# Patient Record
Sex: FEMALE | Race: WHITE | NOT HISPANIC OR LATINO | URBAN - METROPOLITAN AREA
[De-identification: names, ages, dates, MRNs, and addresses within clinical notes are randomized per-mention and may not be internally consistent; named-entity substitution may affect disease eponyms.]

---

## 2015-09-15 PROBLEM — H04.123: Noted: 2020-06-19

## 2017-03-27 ENCOUNTER — IMPORTED ENCOUNTER (OUTPATIENT)
Dept: URBAN - METROPOLITAN AREA CLINIC 50 | Facility: CLINIC | Age: 72
End: 2017-03-27

## 2018-06-20 ENCOUNTER — IMPORTED ENCOUNTER (OUTPATIENT)
Dept: URBAN - METROPOLITAN AREA CLINIC 50 | Facility: CLINIC | Age: 73
End: 2018-06-20

## 2019-05-06 ENCOUNTER — IMPORTED ENCOUNTER (OUTPATIENT)
Dept: URBAN - METROPOLITAN AREA CLINIC 50 | Facility: CLINIC | Age: 74
End: 2019-05-06

## 2020-06-19 ENCOUNTER — IMPORTED ENCOUNTER (OUTPATIENT)
Dept: URBAN - METROPOLITAN AREA CLINIC 50 | Facility: CLINIC | Age: 75
End: 2020-06-19

## 2021-05-14 ASSESSMENT — VISUAL ACUITY
OS_CC: J1+
OS_CC: 20/30
OS_CC: 20/20
OD_BAT: 20/50
OS_PH: 20/30
OD_OTHER: 20/70. 20/100.
OS_OTHER: 20/50. 20/100.
OD_CC: 20/25-
OS_CC: J1+
OS_CC: 20/40
OS_OTHER: 20/30. 20/40.
OD_OTHER: 20/25. 20/40.
OD_CC: J1+
OS_PH: 20/25
OD_CC: J1+-2
OD_CC: J1+
OS_BAT: 20/40
OD_CC: 20/50-2
OD_BAT: 20/40
OS_CC: 20/30+
OS_BAT: 20/50
OD_OTHER: 20/50. 20/80.
OD_CC: 20/25
OD_BAT: 20/70
OS_OTHER: 20/50. 20/80.
OS_OTHER: 20/40. 20/50.
OD_CC: 20/30+
OD_OTHER: 20/40. 20/40.
OS_BAT: 20/30
OD_BAT: 20/25
OD_PH: 20/25
OS_CC: J1+@ 18 IN
OS_CC: J1+-2
OS_CC: 20/25
OD_CC: 20/40
OD_CC: J1+@ 18 IN
OS_BAT: 20/50

## 2021-05-14 ASSESSMENT — TONOMETRY
OD_IOP_MMHG: 18
OS_IOP_MMHG: 19
OD_IOP_MMHG: 19
OS_IOP_MMHG: 18
OS_IOP_MMHG: 20
OD_IOP_MMHG: 18
OD_IOP_MMHG: 19
OS_IOP_MMHG: 20

## 2021-05-28 ENCOUNTER — PREPPED CHART (OUTPATIENT)
Dept: URBAN - METROPOLITAN AREA CLINIC 49 | Facility: CLINIC | Age: 76
End: 2021-05-28

## 2021-06-02 ENCOUNTER — COMPREHENSIVE EXAM (OUTPATIENT)
Dept: URBAN - METROPOLITAN AREA CLINIC 49 | Facility: CLINIC | Age: 76
End: 2021-06-02

## 2021-06-02 DIAGNOSIS — H43.813: ICD-10-CM

## 2021-06-02 DIAGNOSIS — H35.373: ICD-10-CM

## 2021-06-02 DIAGNOSIS — H04.123: ICD-10-CM

## 2021-06-02 DIAGNOSIS — H25.13: ICD-10-CM

## 2021-06-02 PROCEDURE — 92015 DETERMINE REFRACTIVE STATE: CPT

## 2021-06-02 PROCEDURE — 92134 CPTRZ OPH DX IMG PST SGM RTA: CPT

## 2021-06-02 PROCEDURE — 92014 COMPRE OPH EXAM EST PT 1/>: CPT

## 2021-06-02 ASSESSMENT — VISUAL ACUITY
OD_GLARE: 20/70
OS_CC: 20/30-1
OS_GLARE: 20/100
OD_CC: 20/30-1
OD_GLARE: 20/100
OS_GLARE: 20/80
OD_PH: 20/25
OU_CC: J1
OS_PH: 20/30

## 2021-06-02 NOTE — PATIENT DISCUSSION
continue to monitor,  patient is not using any artificial tears at this time states she just blinks to help it.

## 2021-06-11 ENCOUNTER — BIOMETRY (OUTPATIENT)
Dept: URBAN - METROPOLITAN AREA CLINIC 49 | Facility: CLINIC | Age: 76
End: 2021-06-11

## 2021-06-11 DIAGNOSIS — H25.13: ICD-10-CM

## 2021-06-11 PROCEDURE — 92136 OPHTHALMIC BIOMETRY: CPT

## 2021-06-11 PROCEDURE — TOPOIOL CORNEAL TOPOGRAPHY-PREMIUM IOL

## 2021-06-11 ASSESSMENT — KERATOMETRY
OS_K2POWER_DIOPTERS: 41.25
OD_K2POWER_DIOPTERS: 41.75
OS_AXISANGLE_DEGREES: 017
OD_AXISANGLE_DEGREES: 146
OS_K1POWER_DIOPTERS: 43.00
OD_K1POWER_DIOPTERS: 42.50
OD_AXISANGLE2_DEGREES: 56
OS_AXISANGLE2_DEGREES: 107

## 2021-08-09 ENCOUNTER — PRE OP - CE/IOL OS (OUTPATIENT)
Dept: URBAN - METROPOLITAN AREA CLINIC 49 | Facility: CLINIC | Age: 76
End: 2021-08-09

## 2021-08-09 DIAGNOSIS — H25.12: ICD-10-CM

## 2021-08-09 DIAGNOSIS — H35.373: ICD-10-CM

## 2021-08-09 PROCEDURE — PREOP PRE OP VISIT

## 2021-08-09 PROCEDURE — 92134 CPTRZ OPH DX IMG PST SGM RTA: CPT

## 2021-08-09 ASSESSMENT — TONOMETRY
OD_IOP_MMHG: 20
OS_IOP_MMHG: 21

## 2021-08-09 ASSESSMENT — KERATOMETRY
OD_K2POWER_DIOPTERS: 41.75
OS_K1POWER_DIOPTERS: 43.00
OS_AXISANGLE2_DEGREES: 107
OD_AXISANGLE2_DEGREES: 56
OD_AXISANGLE_DEGREES: 146
OS_K2POWER_DIOPTERS: 41.25
OS_AXISANGLE_DEGREES: 017
OD_K1POWER_DIOPTERS: 42.50

## 2021-08-09 ASSESSMENT — VISUAL ACUITY
OD_CC: 20/30
OS_CC: 20/25-1

## 2021-08-09 NOTE — PATIENT DISCUSSION
CATARACT SURGERY PLANNER - MULTIFOCAL IOL/+FEMTO: Phacoemulsification with IOL: Eye: OS|DOS: 8/17/1|Model: [id number]|Power: +19. 5|Target: PLANO|Femto: YES|Arcs: - @ 15 ; - @ 195|Axis: 15|Visc: DUET|Omidria: YES|10% Phenylephrine: YES|Epi-shugarcaine: YES|Phaco Setting: dense|BSS+: NO|Trypan Blue: NO|CTR: NO|Olive Tip: NO|Atropine: NO|Pupilloplasty: NO|Notes: PLAN: [id number] @ PLANO OS.

## 2021-08-17 ENCOUNTER — SAME DAY PO - CE/IOL (OUTPATIENT)
Dept: URBAN - METROPOLITAN AREA CLINIC 49 | Facility: CLINIC | Age: 76
End: 2021-08-17

## 2021-08-17 ENCOUNTER — SURGERY/PROCEDURE (OUTPATIENT)
Dept: URBAN - METROPOLITAN AREA SURGERY 16 | Facility: SURGERY | Age: 76
End: 2021-08-17

## 2021-08-17 DIAGNOSIS — H25.12: ICD-10-CM

## 2021-08-17 DIAGNOSIS — Z98.42: ICD-10-CM

## 2021-08-17 DIAGNOSIS — Z96.1: ICD-10-CM

## 2021-08-17 PROCEDURE — 66984 XCAPSL CTRC RMVL W/O ECP: CPT

## 2021-08-17 PROCEDURE — DFWXXFEMTO SYNERGY TORIC IOL WITH FEMTO

## 2021-08-17 PROCEDURE — 99024 POSTOP FOLLOW-UP VISIT: CPT

## 2021-08-17 RX ORDER — SODIUM CHLORIDE 50 MG/ML
1 SOLUTION OPHTHALMIC
Start: 2021-08-17

## 2021-08-17 ASSESSMENT — KERATOMETRY
OS_AXISANGLE_DEGREES: 017
OD_AXISANGLE2_DEGREES: 56
OS_K2POWER_DIOPTERS: 41.25
OD_K2POWER_DIOPTERS: 41.75
OD_AXISANGLE_DEGREES: 146
OS_AXISANGLE2_DEGREES: 107
OD_AXISANGLE_DEGREES: 146
OS_K2POWER_DIOPTERS: 41.25
OS_AXISANGLE2_DEGREES: 107
OD_AXISANGLE2_DEGREES: 56
OD_K2POWER_DIOPTERS: 41.75
OD_K1POWER_DIOPTERS: 42.50
OS_K1POWER_DIOPTERS: 43.00
OS_K1POWER_DIOPTERS: 43.00
OD_K1POWER_DIOPTERS: 42.50
OS_AXISANGLE_DEGREES: 017

## 2021-08-17 ASSESSMENT — VISUAL ACUITY: OS_SC: 20/80-1

## 2021-08-17 ASSESSMENT — TONOMETRY: OS_IOP_MMHG: 22

## 2021-08-23 ENCOUNTER — PRE OP - CE/IOL OD / 1 WEEK PO OS (OUTPATIENT)
Dept: URBAN - METROPOLITAN AREA CLINIC 49 | Facility: CLINIC | Age: 76
End: 2021-08-23

## 2021-08-23 DIAGNOSIS — H25.11: ICD-10-CM

## 2021-08-23 DIAGNOSIS — Z96.1: ICD-10-CM

## 2021-08-23 DIAGNOSIS — H25.12: ICD-10-CM

## 2021-08-23 PROCEDURE — PREOP PRE OP VISIT

## 2021-08-23 ASSESSMENT — TONOMETRY
OD_IOP_MMHG: 19
OS_IOP_MMHG: 18

## 2021-08-23 ASSESSMENT — KERATOMETRY
OS_AXISANGLE_DEGREES: 017
OD_K2POWER_DIOPTERS: 41.75
OS_K1POWER_DIOPTERS: 43.00
OD_AXISANGLE_DEGREES: 146
OS_K2POWER_DIOPTERS: 41.25
OD_AXISANGLE2_DEGREES: 56
OS_AXISANGLE2_DEGREES: 107
OD_K1POWER_DIOPTERS: 42.50

## 2021-08-23 ASSESSMENT — VISUAL ACUITY
OD_CC: 20/30
OS_SC: J1-2
OS_CC: 20/40+2
OS_SC: 20/25-1

## 2021-08-23 NOTE — PATIENT DISCUSSION
CATARACT SURGERY PLANNER - MULTIFOCAL IOL/+FEMTO: Phacoemulsification with IOL: Eye: OD|DOS: 08/31/2021|Model: [id number]|Power: +17.00|Target: PLANO|Femto: YES|Arcs: 45 @ 0 ; 45 @ 180|Axis: -|Visc: DUET|Omidria: YES|10% Phenylephrine: YES|Epi-shugarcaine: YES|Phaco Setting: DENSE|BSS+: NO|Trypan Blue: NO|CTR: NO|Olive Tip: NO|Atropine: NO|Pupilloplasty: NO|Notes: DENSE. PLAN ORA.

## 2021-08-23 NOTE — PATIENT DISCUSSION
s/p PO # 1 Cataract extraction with * IOL, doing well. Continue with post op drops as directed, see instruction sheet provided to the patient. The patient would like to schedule cataract extraction with * IOL.

## 2021-08-31 ENCOUNTER — SURGERY/PROCEDURE (OUTPATIENT)
Dept: URBAN - METROPOLITAN AREA SURGERY 16 | Facility: SURGERY | Age: 76
End: 2021-08-31

## 2021-08-31 ENCOUNTER — SAME DAY PO - CE/IOL (OUTPATIENT)
Dept: URBAN - METROPOLITAN AREA CLINIC 49 | Facility: CLINIC | Age: 76
End: 2021-08-31

## 2021-08-31 DIAGNOSIS — H25.11: ICD-10-CM

## 2021-08-31 DIAGNOSIS — Z96.1: ICD-10-CM

## 2021-08-31 DIAGNOSIS — Z98.41: ICD-10-CM

## 2021-08-31 PROCEDURE — DFR00VFEMT SYNERGY IOL WITH FEMTO

## 2021-08-31 PROCEDURE — 66984 XCAPSL CTRC RMVL W/O ECP: CPT

## 2021-08-31 PROCEDURE — 99024 POSTOP FOLLOW-UP VISIT: CPT

## 2021-08-31 ASSESSMENT — KERATOMETRY
OD_AXISANGLE_DEGREES: 146
OS_K1POWER_DIOPTERS: 43.00
OD_K1POWER_DIOPTERS: 42.50
OD_AXISANGLE2_DEGREES: 56
OD_K2POWER_DIOPTERS: 41.75
OS_AXISANGLE2_DEGREES: 107
OS_K2POWER_DIOPTERS: 41.25
OS_AXISANGLE_DEGREES: 017

## 2021-08-31 ASSESSMENT — VISUAL ACUITY
OD_SC: 20/60
OD_SC: 20/80
OD_SC: J3

## 2021-08-31 ASSESSMENT — TONOMETRY: OD_IOP_MMHG: 18

## 2021-09-07 ENCOUNTER — 1 WEEK POST-OP (OUTPATIENT)
Dept: URBAN - METROPOLITAN AREA CLINIC 49 | Facility: CLINIC | Age: 76
End: 2021-09-07

## 2021-09-07 DIAGNOSIS — Z98.41: ICD-10-CM

## 2021-09-07 DIAGNOSIS — H35.373: ICD-10-CM

## 2021-09-07 DIAGNOSIS — Z96.1: ICD-10-CM

## 2021-09-07 PROCEDURE — 99024 POSTOP FOLLOW-UP VISIT: CPT

## 2021-09-07 PROCEDURE — 92134 CPTRZ OPH DX IMG PST SGM RTA: CPT

## 2021-09-07 ASSESSMENT — VISUAL ACUITY
OD_SC: 20/40+2
OU_SC: J1
OS_SC: 20/40+2
OU_SC: 20/25-2
OU_CC: J2

## 2021-09-07 ASSESSMENT — TONOMETRY
OD_IOP_MMHG: 18
OS_IOP_MMHG: 18

## 2021-10-04 ENCOUNTER — 4 WEEK POST-OP (OUTPATIENT)
Dept: URBAN - METROPOLITAN AREA CLINIC 49 | Facility: CLINIC | Age: 76
End: 2021-10-04

## 2021-10-04 DIAGNOSIS — H26.493: ICD-10-CM

## 2021-10-04 DIAGNOSIS — Z96.1: ICD-10-CM

## 2021-10-04 DIAGNOSIS — H43.813: ICD-10-CM

## 2021-10-04 DIAGNOSIS — H35.373: ICD-10-CM

## 2021-10-04 DIAGNOSIS — Z98.42: ICD-10-CM

## 2021-10-04 DIAGNOSIS — H16.223: ICD-10-CM

## 2021-10-04 PROCEDURE — 99024 POSTOP FOLLOW-UP VISIT: CPT

## 2021-10-04 RX ORDER — LIFITEGRAST 50 MG/ML: 1 SOLUTION/ DROPS OPHTHALMIC TWICE A DAY

## 2021-10-04 ASSESSMENT — VISUAL ACUITY
OS_GLARE: 20/50
OU_SC: J2@16"
OD_SC: 20/50+2
OD_PH: 20/40
OU_SC: 20/25-2
OS_GLARE: 20/50
OS_SC: J3@16"
OS_SC: J3@26"
OU_SC: J2@26"
OD_SC: J3@16"
OS_SC: 20/30-2
OD_GLARE: 20/70
OD_SC: J5@26"
OD_GLARE: 20/50

## 2021-10-04 ASSESSMENT — TONOMETRY
OD_IOP_MMHG: 15
OS_IOP_MMHG: 15

## 2021-10-04 NOTE — PATIENT DISCUSSION
PCO ([address]): Visually significant PCO present on exam today. Recommend YAG laser capsulotomy to improve vision and decrease glare symptoms. RBAs of procedure discussed. Patient agrees and wishes to proceed. Pt. complains of blurry VA od >OS post surgery.  Still struggling with distance vision and near vision with Synergy IOL.  Will treat SHABBIR with Xiidra ou bid and then plan Yag cap os and then OD.  Pt. understands we need to take a stepwise approach to treat all causes of blurry Va including SHABBIR and PCO.  Also d/w patient possibility of IOL exchange if pt. doesn't respond to dry eye therapy and doesn't want yag cap.  IOL exchange will be difficult if done post yag cap OU.

## 2021-10-25 ENCOUNTER — 2 WEEK FOLLOW-UP (OUTPATIENT)
Dept: URBAN - METROPOLITAN AREA CLINIC 49 | Facility: CLINIC | Age: 76
End: 2021-10-25

## 2021-10-25 DIAGNOSIS — H16.223: ICD-10-CM

## 2021-10-25 DIAGNOSIS — H26.492: ICD-10-CM

## 2021-10-25 PROCEDURE — 66821 AFTER CATARACT LASER SURGERY: CPT

## 2021-10-25 PROCEDURE — 99024 POSTOP FOLLOW-UP VISIT: CPT

## 2021-10-25 ASSESSMENT — VISUAL ACUITY
OD_SC: J5@16"
OD_SC: 20/50-1
OS_SC: 20/40
OD_CC: J7@26"
OS_CC: J5@26"
OS_SC: J3@16"

## 2021-10-25 ASSESSMENT — TONOMETRY
OS_IOP_MMHG: 14
OD_IOP_MMHG: 13

## 2021-10-25 NOTE — PATIENT DISCUSSION
PCO ([address]): Visually significant PCO present on exam today. Recommend YAG laser capsulotomy to improve vision and decrease glare symptoms. RBAs of procedure discussed. Patient agrees and wishes to proceed.

## 2021-10-25 NOTE — PROCEDURE NOTE: CLINICAL
PROCEDURE NOTE: YAG Capsulotomy OS. Diagnosis: Posterior Capsular Opacification (PCO). Prior to treatment, the risks/benefits/alternatives were discussed. The patient wished to proceed with procedure. Consent was signed. Proparacaine and brominidine were placed into the operative eye after the eye was dilated. Power = 3.0mJ. Number of pulses = 14. Patient tolerated procedure well and there were no complications. Post Laser instructions given. Shant Booker

## 2021-11-08 ENCOUNTER — YAG CAPSULOTOMY OD (OUTPATIENT)
Dept: URBAN - METROPOLITAN AREA CLINIC 49 | Facility: CLINIC | Age: 76
End: 2021-11-08

## 2021-11-08 DIAGNOSIS — H26.491: ICD-10-CM

## 2021-11-08 PROCEDURE — 66821 AFTER CATARACT LASER SURGERY: CPT

## 2021-11-08 RX ORDER — LIFITEGRAST 50 MG/ML: 1 SOLUTION/ DROPS OPHTHALMIC TWICE A DAY

## 2021-11-08 ASSESSMENT — TONOMETRY
OD_IOP_MMHG: 13
OS_IOP_MMHG: 14

## 2021-11-08 ASSESSMENT — VISUAL ACUITY
OD_SC: 20/30
OS_SC: 20/30
OU_SC: 20/25
OS_SC: J1+

## 2021-11-08 NOTE — PROCEDURE NOTE: CLINICAL
PROCEDURE NOTE: YAG Capsulotomy OD. Diagnosis: Posterior Capsular Opacification (PCO). Prep: Mydriacil 1% and Phenylephrine 2.5%. Prior to treatment, the risks/benefits/alternatives were discussed. The patient wished to proceed with procedure. Consent was signed. Proparacaine and brominidine were placed into the operative eye after the eye was dilated. Power = 3.0mJ. Number of pulses = 13. Patient tolerated procedure well and there were no complications. Post Laser instructions given. Anderson Gallardo

## 2021-12-13 ENCOUNTER — COMPREHENSIVE EXAM (OUTPATIENT)
Dept: URBAN - METROPOLITAN AREA CLINIC 49 | Facility: CLINIC | Age: 76
End: 2021-12-13

## 2021-12-13 DIAGNOSIS — H35.373: ICD-10-CM

## 2021-12-13 DIAGNOSIS — H43.813: ICD-10-CM

## 2021-12-13 DIAGNOSIS — Z98.890: ICD-10-CM

## 2021-12-13 PROCEDURE — 92134 CPTRZ OPH DX IMG PST SGM RTA: CPT

## 2021-12-13 PROCEDURE — 99024 POSTOP FOLLOW-UP VISIT: CPT

## 2021-12-13 ASSESSMENT — VISUAL ACUITY
OU_SC: 20/40
OU_SC: J2
OD_SC: 20/40-2
OS_SC: 20/30

## 2021-12-13 ASSESSMENT — TONOMETRY
OS_IOP_MMHG: 15
OD_IOP_MMHG: 15

## 2021-12-13 NOTE — PATIENT DISCUSSION
Discussion with patient about correcting residual astigmatism by [email or web address] charge to patient) VS. Opening arcs in office.

## 2021-12-29 ENCOUNTER — CONSULTATION/EVALUATION (OUTPATIENT)
Dept: URBAN - METROPOLITAN AREA CLINIC 50 | Facility: CLINIC | Age: 76
End: 2021-12-29

## 2021-12-29 DIAGNOSIS — H52.7: ICD-10-CM

## 2021-12-29 DIAGNOSIS — H52.03: ICD-10-CM

## 2021-12-29 DIAGNOSIS — H16.223: ICD-10-CM

## 2021-12-29 PROCEDURE — 92015 DETERMINE REFRACTIVE STATE: CPT

## 2021-12-29 PROCEDURE — 92012 INTRM OPH EXAM EST PATIENT: CPT

## 2021-12-29 ASSESSMENT — VISUAL ACUITY
OU_SC: 20/20-2
OD_SC: 20/40-2
OU_SC: 20/30@22IN
OD_PH: 20/25
OS_SC: 20/25+1
OS_SC: 20/30@22IN
OD_SC: J2@14IN
OS_SC: J1@14IN
OU_SC: J1@14IN

## 2021-12-29 ASSESSMENT — TONOMETRY
OD_IOP_MMHG: 15
OS_IOP_MMHG: 15

## 2021-12-29 NOTE — PATIENT DISCUSSION
After reviewing Pentacam measurements OD, patient is a candidate for [address] OD. Discussed RBA thoroughly with patient. She agrees and wishes to schedule next available. Procedure is at no cost to patient. Sent communcation to [facility].

## 2022-02-08 ENCOUNTER — CONTACT LENSES/GLASSES VISIT (OUTPATIENT)
Dept: URBAN - METROPOLITAN AREA CLINIC 49 | Facility: CLINIC | Age: 77
End: 2022-02-08

## 2022-02-08 DIAGNOSIS — H52.7: ICD-10-CM

## 2022-02-08 PROCEDURE — 92015GRNC REFRACTION GLASSES RECHECK NO CHARGE

## 2022-02-08 ASSESSMENT — VISUAL ACUITY
OS_SC: 20/30
OS_CC: 20/30
OD_SC: 20/50
OU_SC: J2
OU_SC: 20/30-2

## 2022-02-08 NOTE — PATIENT DISCUSSION
Glasses Prescription given to patient for doctor's remake. Trial framed with today's Rx and pt happy. Despite having Symfony IOL OU, pt prefers having small add power in glasses.

## 2024-05-25 NOTE — PATIENT DISCUSSION
LENS OPTION (PREMIUM): Discussed with patient in detail all available methods and lens options as well as their associated benefits, limitations and out-of-pocket costs. Patient chooses femtosecond laser-assisted cataract surgery with presbyopia-correcting lens implant. The patient understands that with any IOL there is no guarantee that they will not require glasses to see their best at any distance after surgery. The risks, benefits and alternatives to surgery were explained and all questions were answered. No

## 2025-02-20 NOTE — PATIENT DISCUSSION
DECREASE Insulin to 18 U daily  HOLD insulin is glucose is <100   CALL if has multiple lows (<100) or highs (>150s)   continue to monitor,  patient is not using any artificial tears at this time states she just blinks to help it.